# Patient Record
Sex: FEMALE | URBAN - METROPOLITAN AREA
[De-identification: names, ages, dates, MRNs, and addresses within clinical notes are randomized per-mention and may not be internally consistent; named-entity substitution may affect disease eponyms.]

---
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
## 2019-09-06 NOTE — PATIENT DISCUSSION
PCO  OD (Posterior Capsule Opacification)   PCO is visually significant and impairment of vision does not meet the patient’s functional needs or interferes with activities of daily living. Risks benefits and alternatives to the Nd:YAG Laser reviewed including elevated IOP immediately postop and retinal tear/detachment. Patient to notify their ophthalmologist promptly if they have a significant change in symptoms such as flashes of light (photopsia) an increase in floaters loss of visual field or decrease in visual acuity after the procedure. Patient will be scheduled in [place] [id number] for Nd:YAG Laser.

## 2019-09-06 NOTE — PATIENT DISCUSSION
Nuclear Sclerotic Cataract OS: Discussed the risks benefits alternatives and limitations of cataract surgery including infection bleeding loss of vision retinal tears detachment. The patient stated a full understanding and a desire to proceed with the procedure in the left eye. Refractive options were reviewed. Patient has elected to be optimized for distance vision in the left eye. The patient will still need glasses for reading and to possibly fine tune distance vision. Might want MFIOL OS

## 2019-09-06 NOTE — PATIENT DISCUSSION
Cataract OS: Discussed the risks benefits alternatives and limitations of cataract surgery including infection bleeding loss of vision retinal tears detachment. The patient stated a full understanding and a desire to proceed with the procedure in the left eye. Refractive options were reviewed. Pt has elected MFIOL with femto laser assist and ORA guidance to optimize lens power choice. The pt may still need glasses to possibly fine tune uncorrected vision. The possibility of glare and halo were reviewed with the patient.

## 2019-09-26 NOTE — PATIENT DISCUSSION
Cataract OS: Discussed the risks benefits alternatives and limitations of cataract surgery including infection bleeding loss of vision retinal tears detachment. The patient stated a full understanding and a desire to proceed with the procedure in the left eye. Refractive options were reviewed. Pt has elected MFIOL with femto assist and ORA guidance to optimize lens power choice. The pt may still need glasses to possibly fine tune uncorrected vision. The patient understands the risk of glare and halo at night. OS only. distance.  ORA recommnded

## 2019-10-08 NOTE — PATIENT DISCUSSION
Post-Op Day #1 - Cataract Surgery Left Eye (OS) - doing well. Tears prn. Continue postop drops as directed. Call office with symptoms of pain redness or decreased vision in operative eye.

## 2019-10-16 NOTE — PATIENT DISCUSSION
Post-Op Week #2 - Cataract Surgery Left Eye (OS) -  Intraocular lens stable and surgery very well healed. Patient to resume all normal activities. Finish postop drops as directed. Final Refraction given if necessary. OD- subjective blurry vision in nasal field. Most likely due to symptomatic vitreous opacity. Has appt with Dr Myra Pryor. No evidence of other cause of her symptoms

## 2021-01-07 ENCOUNTER — IMPORTED ENCOUNTER (OUTPATIENT)
Dept: URBAN - METROPOLITAN AREA CLINIC 9 | Facility: CLINIC | Age: 21
End: 2021-01-07

## 2021-01-07 PROBLEM — H10.13: Noted: 2021-01-07

## 2021-10-18 ASSESSMENT — TONOMETRY
OD_IOP_MMHG: 19
OS_IOP_MMHG: 20
OD_IOP_MMHG: 19
OS_IOP_MMHG: 19

## 2021-10-18 ASSESSMENT — KERATOMETRY
OS_K2POWER_DIOPTERS: 42
OD_K2POWER_DIOPTERS: 41.75
OS_AXISANGLE_DEGREES: 168
OD_AXISANGLE_DEGREES: 15
OS_AXISANGLE_DEGREES: 169
OD_AXISANGLE2_DEGREES: 105
OD_AXISANGLE2_DEGREES: 107
OD_AXISANGLE_DEGREES: 17
OS_AXISANGLE2_DEGREES: 79
OD_K1POWER_DIOPTERS: 41
OS_K1POWER_DIOPTERS: 40.75
OD_K1POWER_DIOPTERS: 40.75
OS_K2POWER_DIOPTERS: 41.75
OS_AXISANGLE2_DEGREES: 78
OD_K2POWER_DIOPTERS: 41.75
OS_K1POWER_DIOPTERS: 41

## 2021-10-18 ASSESSMENT — VISUAL ACUITY
OD_CC: 20/20 SN
OD_SC: 20/20 SN
OD_CC: 20/20 SN
OS_CC: 20/20 SN
OS_CC: 20/20 SN
OS_SC: 20/20 SN

## 2022-11-10 ENCOUNTER — ESTABLISHED PATIENT (OUTPATIENT)
Dept: URBAN - METROPOLITAN AREA CLINIC 17 | Facility: CLINIC | Age: 22
End: 2022-11-10

## 2022-11-10 DIAGNOSIS — H04.123: ICD-10-CM

## 2022-11-10 DIAGNOSIS — R51.9: ICD-10-CM

## 2022-11-10 DIAGNOSIS — G43.909: ICD-10-CM

## 2022-11-10 DIAGNOSIS — G43.B0: ICD-10-CM

## 2022-11-10 DIAGNOSIS — H52.13: ICD-10-CM

## 2022-11-10 PROCEDURE — 92015 DETERMINE REFRACTIVE STATE: CPT

## 2022-11-10 PROCEDURE — 92014 COMPRE OPH EXAM EST PT 1/>: CPT

## 2022-11-10 ASSESSMENT — KERATOMETRY
OS_K2POWER_DIOPTERS: 41.50
OD_AXISANGLE_DEGREES: 145
OD_K2POWER_DIOPTERS: 40.75
OS_AXISANGLE_DEGREES: 170
OD_K1POWER_DIOPTERS: 41.00
OS_AXISANGLE2_DEGREES: 80
OS_K1POWER_DIOPTERS: 40.75
OD_AXISANGLE2_DEGREES: 55

## 2022-11-10 ASSESSMENT — TONOMETRY
OD_IOP_MMHG: 17
OS_IOP_MMHG: 16

## 2022-11-10 ASSESSMENT — VISUAL ACUITY
OD_CC: 20/20
OS_CC: 20/20